# Patient Record
Sex: FEMALE | Race: WHITE | Employment: FULL TIME | URBAN - METROPOLITAN AREA
[De-identification: names, ages, dates, MRNs, and addresses within clinical notes are randomized per-mention and may not be internally consistent; named-entity substitution may affect disease eponyms.]

---

## 2018-07-30 ENCOUNTER — OFFICE VISIT (OUTPATIENT)
Dept: FAMILY MEDICINE CLINIC | Age: 48
End: 2018-07-30

## 2018-07-30 VITALS
WEIGHT: 171 LBS | TEMPERATURE: 98.4 F | OXYGEN SATURATION: 100 % | HEIGHT: 62 IN | SYSTOLIC BLOOD PRESSURE: 122 MMHG | BODY MASS INDEX: 31.47 KG/M2 | RESPIRATION RATE: 16 BRPM | DIASTOLIC BLOOD PRESSURE: 69 MMHG | HEART RATE: 74 BPM

## 2018-07-30 DIAGNOSIS — N91.2 AMENORRHEA: ICD-10-CM

## 2018-07-30 DIAGNOSIS — Z76.89 ENCOUNTER TO ESTABLISH CARE: ICD-10-CM

## 2018-07-30 DIAGNOSIS — Z13.220 SCREENING CHOLESTEROL LEVEL: ICD-10-CM

## 2018-07-30 DIAGNOSIS — R20.2 PARESTHESIA OF LEFT ARM: Primary | ICD-10-CM

## 2018-07-30 NOTE — MR AVS SNAPSHOT
[address] [place] [address] 
[phone] Patient: Juan Carlos Hernandez MRN: [id number] :[id number] Visit Information Date & Time Provider Department Dept. Phone Encounter #  
 2018  1:00 PM Papito Case NP [facility] [phone] [id number] Follow-up Instructions Return in about 2 months (around 2018) for Routine Physical Exam. Upcoming Health Maintenance Date Due Pneumococcal 19-64 Medium Risk (1 of 1 - PPSV23) 3/7/1988 PAP AKA CERVICAL CYTOLOGY 3/7/1990 DTaP/Tdap/Td series (2 - Tdap) 2016 Influenza Age 5 to Adult 2018 Allergies as of 2018  Review Complete On: 2018 By: Papito Case NP Severity Noted Reaction Type Reactions Sulfa (Sulfonamide Antibiotics)  2010    Nausea and Vomiting And rash. ... Current Immunizations  Reviewed on 2018 Name Date DTAP/HEPB/IPV Vaccine 2006 Hepatitis B Vaccine 2012, 2011, 2011 Influenza Vaccine 10/14/2017 PPD 2010, 2008 Reviewed by Clearance PARVEEN Ceballos on 7459 at  8:03 AM  
You Were Diagnosed With   
  
 Codes Comments Paresthesia of left arm    -  Primary ICD-10-CM: R20.2 ICD-9-CM: 782.0 Screening cholesterol level     ICD-10-CM: G46.204 ICD-9-CM: V77.91 Encounter to establish care     ICD-10-CM: Z76.89 
ICD-9-CM: V65.8 Amenorrhea     ICD-10-CM: N91.2 ICD-9-CM: 626.0 Vitals BP Pulse Temp Resp Height(growth percentile) Weight(growth percentile) 122/69 (BP 1 Location: Right arm, BP Patient Position: Sitting) 74 98.4 °F (36.9 °C) (Oral) 16 5' 2\" (1.575 m) 171 lb (77.6 kg) SpO2 BMI OB Status Smoking Status 100% 31.28 kg/m2 Premenopausal Never Smoker Vitals History BMI and BSA Data Body Mass Index Body Surface Area  
 31.28 kg/m 2 1.84 m 2 Preferred Pharmacy Pharmacy Name Phone [address] [phone] Your Updated Medication List  
  
Notice  As of 7/30/2018  1:59 PM  
 You have not been prescribed any medications. We Performed the Following CBC WITH AUTOMATED DIFF [73577 CPT(R)] ESTRADIOL [id number] CPT(R)] FOLATE [id number] CPT(R)] FOLLICLE STIMULATING HORMONE [86719 CPT(R)] HEMOGLOBIN A1C WITH EAG [43927 CPT(R)] HIV 1/2 AG/AB, 4TH GENERATION,W RFLX CONFIRM [id number] CPT(R)] LIPID PANEL [77419 CPT(R)] METABOLIC PANEL, COMPREHENSIVE [77724 CPT(R)] TSH REFLEX TO T4 [81743 CPT(R)] VITAMIN B12 [id number] CPT(R)] Follow-up Instructions Return in about 2 months (around 9/30/2018) for Routine Physical Exam. To-Do List   
 07/30/2018 Neurology:  EMG TWO EXTREMITIES UPPER Patient Instructions Numbness and Tingling: Care Instructions Your Care Instructions Many things can cause numbness or tingling. Swelling may put pressure on a nerve. This could cause you to lose feeling or have a pins-and-needles sensation on part of your body. Nerves may be damaged from trauma, toxins, or diseases, such as diabetes or multiple sclerosis (MS). Sometimes, though, the cause is not clear. If there is no clear reason for your symptoms, and you are not having any other symptoms, your doctor may suggest watching and waiting for a while to see if the numbness or tingling goes away on its own. Your doctor may want you to have blood or nerve tests to find the cause of your symptoms. Follow-up care is a key part of your treatment and safety. Be sure to make and go to all appointments, and call your doctor if you are having problems. It's also a good idea to know your test results and keep a list of the medicines you take. How can you care for yourself at home? · If your doctor prescribes medicine, take it exactly as directed. Call your doctor if you think you are having a problem with your medicine. · If you have any swelling, put ice or a cold pack on the area for 10 to 20 minutes at a time. Put a thin cloth between the ice and your skin. When should you call for help? Call 911 anytime you think you may need emergency care. For example, call if: 
  · You have weakness, numbness, or tingling in both legs.  
  · You lose bowel or bladder control.  
  · You have symptoms of a stroke. These may include: 
¨ Sudden numbness, tingling, weakness, or loss of movement in your face, arm, or leg, especially on only one side of your body. ¨ Sudden vision changes. ¨ Sudden trouble speaking. ¨ Sudden confusion or trouble understanding simple statements. ¨ Sudden problems with walking or balance. ¨ A sudden, severe headache that is different from past headaches.  
 Watch closely for changes in your health, and be sure to contact your doctor if you have any problems, or if: 
  · You do not get better as expected. Where can you learn more? Go to http://moustapha-aspen.info/. Enter P072 in the search box to learn more about \"Numbness and Tingling: Care Instructions. \" Current as of: September 10, 2017 Content Version: 11.7 © 7997-3338 "[employer]". Care instructions adapted under license by [employer] (which disclaims liability or warranty for this information). If you have questions about a medical condition or this instruction, always ask your healthcare professional. [place] [address] any warranty or liability for your use of this information. Learning About Menopause What is menopause? For most women, menopause is a natural process of aging. Menstrual periods gradually stop. The ability to become pregnant ends. Some women feel relief that their childbearing years are ending.  But other women struggle with the physical and emotional changes that come with menopause. For most women, menopause happens around age 48. But every woman's body has its own timeline. Some women stop having periods in their mid-40s. Others keep having periods well into their 50s. And some women go through menopause early because of cancer treatment or surgery to remove the ovaries. What can you expect with menopause? · It starts with perimenopause. This is the process of change that leads up to menopause. Perimenopause can start as early as your late 35s or as late as your early 46s. How long it lasts varies. But it usually lasts from 2 to 8 years. · During this time, your hormone levels will go up and down unevenly (fluctuate). This causes changes in your periods and other symptoms. In time, estrogen and progesterone levels drop enough that the menstrual cycle stops. Going a full year without having a period is usually considered menopause. · Low estrogen levels after menopause speed bone loss. This increases your risk of osteoporosis. Also, your risk of heart disease increases after menopause. · It's normal to have thinner, dryer, wrinkled skin after menopause. The vaginal lining and the lower urinary tract also thin and weaken. This can make it hard to have sex. It can also increase the risk of vaginal and urinary tract infections. What are the symptoms? · Lighter or heavier periods. Your menstrual cycle may be longer or shorter. You may skip periods. · Hot flashes. You may have a sudden feeling of intense body heat. You may sweat, and your head, neck, and chest may get red. Along with hot flashes, you may have a heartbeat that's too fast or not regular. You may also feel anxious or grouchy. In rare cases you might feel panic. · Trouble sleeping. · Mood swings or feeling grouchy, depressed, or worried. · Problems with remembering or thinking clearly. · Vaginal dryness. Some women have only a few mild symptoms. Others have severe symptoms that disrupt their sleep and daily lives. Symptoms tend to last or get worse the first year or more after menopause. Over time, hormones even out at low levels. Many symptoms improve or go away. But some women may have symptoms that don't go away. How are menopause symptoms treated? 
 If you have mild symptoms, you may get some relief if you eat healthy foods, exercise, and lower your stress. Some women choose to take medicines if they have severe symptoms that aren't helped by making changes to their lifestyle. 
 Lifestyle changes 
  · Choose a heart-healthy diet that is low in saturated fat. It should include plenty of fish, fruits, vegetables, beans, and high-fiber grains and breads. Be sure you get enough calcium and vitamin D to help your bones stay strong. Low-fat or nonfat dairy products are a great source of calcium.  
  · Get regular exercise. Exercise can help you manage your weight, keep your heart and bones strong, and lift your mood.  
  · Limit caffeine, alcohol, and stress. These things can make symptoms worse. Limiting them may help you sleep better.  
  · If you smoke, stop. Quitting smoking can reduce hot flashes and long-term health risks. Medicines 
 If your symptoms are severe, talk with your doctor. You may want to try prescription medicines, such as: 
  · Low-dose birth control pills before menopause.  
  · Low-dose hormone therapy (HT) after menopause.  
  · Antidepressants.  
  · A medicine called clonidine (Catapres) that is usually used to treat high blood pressure.  
 All medicines for menopause symptoms have possible risks or side effects. A very small number of women develop serious health problems when taking hormone therapy. Be sure to talk to your doctor about your possible health risks before you start a treatment for menopause symptoms. 
 Other treatments 
 You can try:   · Breathing exercises. They may help reduce hot flashes and emotional symptoms.  
  · Soy. Some women feel that eating lots of soy helps even out their menopause symptoms.  
  · Yoga or biofeedback. They may help reduce stress. Follow-up care is a key part of your treatment and safety. Be sure to make and go to all appointments, and call your doctor if you are having problems. It's also a good idea to know your test results and keep a list of the medicines you take. Where can you learn more? Go to http://moustapha-aspen.info/. Enter H199 in the search box to learn more about \"Learning About Menopause. \" Current as of: October 6, 2017 Content Version: 11.7 © 5524-8077 [employer] (Propri[employer]). Care instructions adapted under license by [employer] (which disclaims liability or warranty for this information). If you have questions about a medical condition or this instruction, always ask your healthcare professional. [place] [address] any warranty or liability for your use of this information. Introducing [facility]! Dear Kallie Goodwin: Thank you for requesting a [employer] account. Our records indicate that you already have an active [employer] account. You can access your account anytime at [email or web address] Did you know that you can access your hospital and ER discharge instructions at any time in [employer]? You can also review all of your test results from your hospital stay or ER visit. Additional Information If you have questions, please visit the Frequently Asked Questions section of the [employer] website at [email or web address] Remember, [employer] is NOT to be used for urgent needs. For medical emergencies, dial 911. Now available from your iPhone and Android! Please provide this summary of care documentation to your next provider. Your primary care clinician is listed as Mony Martínez. If you have any questions after today's visit, please call [phone].

## 2018-07-30 NOTE — PATIENT INSTRUCTIONS
Numbness and Tingling: Care Instructions  Your Care Instructions    Many things can cause numbness or tingling. Swelling may put pressure on a nerve. This could cause you to lose feeling or have a pins-and-needles sensation on part of your body. Nerves may be damaged from trauma, toxins, or diseases, such as diabetes or multiple sclerosis (MS). Sometimes, though, the cause is not clear. If there is no clear reason for your symptoms, and you are not having any other symptoms, your doctor may suggest watching and waiting for a while to see if the numbness or tingling goes away on its own. Your doctor may want you to have blood or nerve tests to find the cause of your symptoms. Follow-up care is a key part of your treatment and safety. Be sure to make and go to all appointments, and call your doctor if you are having problems. It's also a good idea to know your test results and keep a list of the medicines you take. How can you care for yourself at home? · If your doctor prescribes medicine, take it exactly as directed. Call your doctor if you think you are having a problem with your medicine. · If you have any swelling, put ice or a cold pack on the area for 10 to 20 minutes at a time. Put a thin cloth between the ice and your skin. When should you call for help? Call 911 anytime you think you may need emergency care. For example, call if:    · You have weakness, numbness, or tingling in both legs.     · You lose bowel or bladder control.     · You have symptoms of a stroke. These may include:  ¨ Sudden numbness, tingling, weakness, or loss of movement in your face, arm, or leg, especially on only one side of your body. ¨ Sudden vision changes. ¨ Sudden trouble speaking. ¨ Sudden confusion or trouble understanding simple statements. ¨ Sudden problems with walking or balance.   ¨ A sudden, severe headache that is different from past headaches.    Watch closely for changes in your health, and be sure to contact your doctor if you have any problems, or if:    · You do not get better as expected. Where can you learn more? Go to http://moustapha-aspen.info/. Enter W244 in the search box to learn more about \"Numbness and Tingling: Care Instructions. \"  Current as of: September 10, 2017  Content Version: 11.7  © 2384-9420 [employer]. Care instructions adapted under license by [employer] (which disclaims liability or warranty for this information). If you have questions about a medical condition or this instruction, always ask your healthcare professional. [place] [address] any warranty or liability for your use of this information. Learning About Menopause  What is menopause? For most women, menopause is a natural process of aging. Menstrual periods gradually stop. The ability to become pregnant ends. Some women feel relief that their childbearing years are ending. But other women struggle with the physical and emotional changes that come with menopause. For most women, menopause happens around age 48. But every woman's body has its own timeline. Some women stop having periods in their mid-40s. Others keep having periods well into their 50s. And some women go through menopause early because of cancer treatment or surgery to remove the ovaries. What can you expect with menopause? · It starts with perimenopause. This is the process of change that leads up to menopause. Perimenopause can start as early as your late 35s or as late as your early 46s. How long it lasts varies. But it usually lasts from 2 to 8 years. · During this time, your hormone levels will go up and down unevenly (fluctuate). This causes changes in your periods and other symptoms. In time, estrogen and progesterone levels drop enough that the menstrual cycle stops. Going a full year without having a period is usually considered menopause.   · Low estrogen levels after menopause speed bone loss. This increases your risk of osteoporosis. Also, your risk of heart disease increases after menopause. · It's normal to have thinner, dryer, wrinkled skin after menopause. The vaginal lining and the lower urinary tract also thin and weaken. This can make it hard to have sex. It can also increase the risk of vaginal and urinary tract infections. What are the symptoms? · Lighter or heavier periods. Your menstrual cycle may be longer or shorter. You may skip periods. · Hot flashes. You may have a sudden feeling of intense body heat. You may sweat, and your head, neck, and chest may get red. Along with hot flashes, you may have a heartbeat that's too fast or not regular. You may also feel anxious or grouchy. In rare cases you might feel panic. · Trouble sleeping. · Mood swings or feeling grouchy, depressed, or worried. · Problems with remembering or thinking clearly. · Vaginal dryness. Some women have only a few mild symptoms. Others have severe symptoms that disrupt their sleep and daily lives. Symptoms tend to last or get worse the first year or more after menopause. Over time, hormones even out at low levels. Many symptoms improve or go away. But some women may have symptoms that don't go away. How are menopause symptoms treated?   If you have mild symptoms, you may get some relief if you eat healthy foods, exercise, and lower your stress. Some women choose to take medicines if they have severe symptoms that aren't helped by making changes to their lifestyle.   Lifestyle changes    · Choose a heart-healthy diet that is low in saturated fat. It should include plenty of fish, fruits, vegetables, beans, and high-fiber grains and breads. Be sure you get enough calcium and vitamin D to help your bones stay strong. Low-fat or nonfat dairy products are a great source of calcium.     · Get regular exercise.  Exercise can help you manage your weight, keep your heart and bones strong, and lift your mood.     · Limit caffeine, alcohol, and stress. These things can make symptoms worse. Limiting them may help you sleep better.     · If you smoke, stop. Quitting smoking can reduce hot flashes and long-term health risks. Medicines   If your symptoms are severe, talk with your doctor. You may want to try prescription medicines, such as:    · Low-dose birth control pills before menopause.     · Low-dose hormone therapy (HT) after menopause.     · Antidepressants.     · A medicine called clonidine (Catapres) that is usually used to treat high blood pressure.    All medicines for menopause symptoms have possible risks or side effects. A very small number of women develop serious health problems when taking hormone therapy. Be sure to talk to your doctor about your possible health risks before you start a treatment for menopause symptoms.   Other treatments   You can try:    · Breathing exercises. They may help reduce hot flashes and emotional symptoms.     · Soy. Some women feel that eating lots of soy helps even out their menopause symptoms.     · Yoga or biofeedback. They may help reduce stress. Follow-up care is a key part of your treatment and safety. Be sure to make and go to all appointments, and call your doctor if you are having problems. It's also a good idea to know your test results and keep a list of the medicines you take. Where can you learn more? Go to http://moustapha-aspen.info/. Enter H199 in the search box to learn more about \"Learning About Menopause. \"  Current as of: October 6, 2017  Content Version: 11.7  © 8423-8339 [employer]. Care instructions adapted under license by [employer] (which disclaims liability or warranty for this information). If you have questions about a medical condition or this instruction, always ask your healthcare professional. [place] [address] any warranty or liability for your use of this information.

## 2018-07-30 NOTE — PROGRESS NOTES
Chief Complaint   Patient presents with    New Patient     to est. [facility] (former patient here of Dr. Junior Gardner )    Claudication     burning sensation in left arm since about March, feels heavy . Went to [facility] ER this past Thurs. Reviewed Record in preparation for visit and have obtained necessary documentation. Identified pt with two pt identifiers (Name @ )    Health Maintenance Due   Topic    Pneumococcal 19-64 Medium Risk (1 of 1 - PPSV23)    PAP AKA CERVICAL CYTOLOGY     DTaP/Tdap/Td series (2 - Tdap)         1. Have you been to the ER, urgent care clinic since your last visit? Hospitalized since your last visit? See todays encounter    2. Have you seen or consulted any other health care providers outside of the [facility] since your last visit? Include any pap smears or colon screening.  no

## 2018-07-30 NOTE — PROGRESS NOTES
[facility] Note  HPI:   Rosemary Levin is a 51 y.o. female who presents to establish care. Previous provider: Constantine Jesus MD    Paresthesia   Onset 5 months ago, gradually worsening since this time. Location: Left arm, from 1-2 inches above and below elbow. Sometimes symptoms will radiate down to wrist, or up to shoulder. She denies neck pain or decreased ROM, denies shoulder, elbow or wrist joint pain. Symptoms include burning sensation, moderate in severity. Symptoms are intermittent. Last a few seconds at a time, but can come and go for 30 minutes at a time. Aggravating factors: worse when she lays down. Alleviating: shaking arm. Associated symptoms: sensation of \"heaviness\" or weakness of left arm, but has not tried to lift anything up so she cannot confirm true weakness. She had one episode last week with associated nausea, headaches and dizziness. She presented to ER at [facility] at this time; she reports normal EKG, normal labs except for an elevated hgb, \"which they were not concerned about. \" She has a history of BL carpel tunnel s/p repair many years ago and she states this pain feels different. She also endorses occasional muscle soreness of mid upper back, between shoulder blades, since November 2017. However, she does not correlate this pain with arm sensation and heaviness. She reports routine Pap smears are done with GYN. She is agreeable to release records. Last periods was over 1 year ago. She would like to have lab tests drawn today to confirm menopause, she was planning to have these performed at the end of the summer at her next GYN visit. Allergies   Allergen Reactions    Sulfa (Sulfonamide Antibiotics) Nausea and Vomiting     And rash. ...       Patient Active Problem List   Diagnosis Code    Asthma, Mild, Intermittent J45.909    Allergic rhinitis J30.9     Past Medical History:   Diagnosis Date    Allergic rhinitis 9/25/2012    Asthma Well controlled, has not used albuterol in years. She declines albuterol refill on 7/202018 -CW    Carpal tunnel syndrome, bilateral       Past Surgical History:   Procedure Laterality Date    HX CARPAL TUNNEL RELEASE Bilateral     HX CHOLECYSTECTOMY  4/2009      No LMP recorded. Patient is not currently having periods (Reason: Premenopausal). Family History   Problem Relation Age of Onset    Liver Disease Mother      fatty liver    Hypertension Father     Heart Disease Father     Elevated Lipids Father     Elevated Lipids Brother     Diabetes Maternal Grandmother     Stroke Maternal Grandmother     No Known Problems Maternal Grandfather     Alzheimer Paternal Grandmother     Heart Attack Paternal Grandfather     Pancreatic Cancer Paternal Grandfather     No Known Problems Son     No Known Problems Son     Thyroid Disease Neg Hx     Breast Cancer Neg Hx     Uterine Cancer Neg Hx     Ovarian Cancer Neg Hx     Colon Cancer Neg Hx       Social History     Social History    Marital status:      Spouse name: N/A    Number of children: 2    Years of education: N/A     Occupational History     at [employer] History Main Topics    Smoking status: Never Smoker    Smokeless tobacco: Never Used    Alcohol use Yes      Comment: ocassional, 1/per 6 months    Drug use: No    Sexual activity: Yes     Partners: Male     Other Topics Concern    Not on file     Social History Narrative    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 Lives at home with Son. Exercise: ADL's, denies purposeful exercise. Diet: Well balanced, generally avoids fried foods, fatty foods. ROS:   Review of Systems   Constitutional: Negative for chills, fever, malaise/fatigue and weight loss. HENT: Negative for congestion, hearing loss, sinus pain, sore throat and tinnitus. Eyes: Negative for blurred vision, double vision, photophobia and pain.         Endorses some blurred vision with long hours at the computer. She has prescriptive lenses, she has not had routine eye exam in many years. Respiratory: Negative for cough, shortness of breath and wheezing. Cardiovascular: Negative for chest pain, claudication and leg swelling. Gastrointestinal: Negative for abdominal pain, blood in stool, constipation, diarrhea, melena, nausea and vomiting. Genitourinary: Negative for dysuria and urgency. Musculoskeletal: Positive for back pain. Negative for falls, joint pain, myalgias and neck pain. Skin: Negative for itching and rash. Neurological: Positive for dizziness, sensory change and headaches. Negative for tingling, tremors, speech change, focal weakness, seizures, loss of consciousness and weakness. Endo/Heme/Allergies: Negative for polydipsia. Psychiatric/Behavioral: Negative for depression and substance abuse. The patient is not nervous/anxious. Physical Exam:     Visit Vitals    /69 (BP 1 Location: Right arm, BP Patient Position: Sitting)    Pulse 74    Temp 98.4 °F (36.9 °C) (Oral)    Resp 16    Ht 5' 2\" (1.575 m)    Wt 171 lb (77.6 kg)    SpO2 100%    BMI 31.28 kg/m2        Vitals and Nurse Documentation reviewed. Physical Exam   Constitutional: She is oriented to person, place, and time and well-developed, well-nourished, and in no distress. HENT:   Head: Normocephalic and atraumatic. Right Ear: Hearing, external ear and ear canal normal. Tympanic membrane is not injected and not perforated. No middle ear effusion. No decreased hearing is noted. Left Ear: Hearing, external ear and ear canal normal. Tympanic membrane is not injected and not perforated. No middle ear effusion. No decreased hearing is noted. Nose: Nose normal. No mucosal edema or rhinorrhea. Mouth/Throat: Uvula is midline and oropharynx is clear and moist.   Uvula rises with phonation   Eyes: Conjunctivae and EOM are normal. Pupils are equal, round, and reactive to light.    Visual acuity intact Neck: Normal range of motion and phonation normal. Neck supple. Cardiovascular: Normal rate, regular rhythm, normal heart sounds and intact distal pulses. Exam reveals no gallop and no friction rub. No murmur heard. Pulmonary/Chest: Effort normal and breath sounds normal. No respiratory distress. She has no wheezes. She has no rales. She exhibits no tenderness. Musculoskeletal: She exhibits no edema or tenderness. Left shoulder: She exhibits crepitus. She exhibits normal range of motion, no tenderness, no bony tenderness, no swelling and no effusion. Left elbow: She exhibits normal range of motion, no swelling, no effusion and no deformity. No tenderness found. Left wrist: She exhibits normal range of motion, no tenderness, no bony tenderness, no swelling and no deformity. Cervical back: She exhibits normal range of motion, no tenderness, no bony tenderness, no swelling, no edema, no deformity, no pain and no spasm. Thoracic back: She exhibits normal range of motion, no tenderness, no bony tenderness and no pain. Lymphadenopathy:     She has no cervical adenopathy. Neurological: She is alert and oriented to person, place, and time. She has normal sensation, normal strength, normal reflexes and intact cranial nerves. She displays no weakness, no atrophy and facial symmetry. She has a normal Romberg Test. She shows no pronator drift. Gait normal. Coordination normal.   Reflex Scores:       Tricep reflexes are 2+ on the right side and 2+ on the left side. Bicep reflexes are 2+ on the right side and 2+ on the left side. Brachioradialis reflexes are 2+ on the right side and 2+ on the left side. Patellar reflexes are 2+ on the right side and 2+ on the left side. Achilles reflexes are 2+ on the right side and 2+ on the left side. Skin: Skin is warm and dry. She is not diaphoretic.    Psychiatric: Mood, memory, affect and judgment normal.   Nursing note and vitals reviewed. Assessment/ Plan:   [employer] were discussed including hours of operation, on-call providers, and MyChart. Diagnoses and all orders for this visit:    1. Paresthesia of left arm: Chronic (5 months), progressive asymmetric neuropathy of left arm, without neurological deficits on exam today. Unclear etiology, will proceed with EMG testing,as well as basic screening labs. Follow-up and further work-up based on results of study. Encouraged follow-up with opthalmology for periodic vision complaints. -     CBC WITH AUTOMATED DIFF  -     HIV 1/2 AG/AB, 4TH GENERATION,W RFLX CONFIRM  -     TSH REFLEX TO T4  -     VITAMIN B12  -     HEMOGLOBIN A1C WITH EAG  -     METABOLIC PANEL, COMPREHENSIVE  -     FOLATE  -     EMG TWO EXTREMITIES UPPER; Future    2. Screening cholesterol level: Fasting lipids to assess CVD risk.   -     LIPID PANEL    3. Encounter to establish care    4.  Amenorrhea: Reports last period greater than 12 months ago, labs today to confirm post-menopausal status.   -     FOLLICLE STIMULATING HORMONE  -     ESTRADIOL        Follow-up Disposition:  Return in about 2 months (around 9/30/2018) for Routine Physical Exam.     Discussed expected course/resolution/complications of diagnosis in detail with patient.    Medication risks/benefits/costs/interactions/alternatives discussed with patient.    Pt was given an after visit summary which includes diagnoses, current medications & vitals.  Pt expressed understanding with the diagnosis and plan

## 2018-07-31 DIAGNOSIS — E87.5 SERUM POTASSIUM ELEVATED: Primary | ICD-10-CM

## 2018-07-31 DIAGNOSIS — E83.52 SERUM CALCIUM ELEVATED: ICD-10-CM

## 2018-07-31 LAB
ALBUMIN SERPL-MCNC: 5 G/DL (ref 3.5–5.5)
ALBUMIN/GLOB SERPL: 1.7 {RATIO} (ref 1.2–2.2)
ALP SERPL-CCNC: 86 IU/L (ref 39–117)
ALT SERPL-CCNC: 25 IU/L (ref 0–32)
AST SERPL-CCNC: 25 IU/L (ref 0–40)
BASOPHILS # BLD AUTO: 0.1 X10E3/UL (ref 0–0.2)
BASOPHILS NFR BLD AUTO: 1 %
BILIRUB SERPL-MCNC: 0.6 MG/DL (ref 0–1.2)
BUN SERPL-MCNC: 13 MG/DL (ref 6–24)
BUN/CREAT SERPL: 15 (ref 9–23)
CALCIUM SERPL-MCNC: 10.5 MG/DL (ref 8.7–10.2)
CHLORIDE SERPL-SCNC: 100 MMOL/L (ref 96–106)
CHOLEST SERPL-MCNC: 195 MG/DL (ref 100–199)
CO2 SERPL-SCNC: 24 MMOL/L (ref 20–29)
CREAT SERPL-MCNC: 0.89 MG/DL (ref 0.57–1)
EOSINOPHIL # BLD AUTO: 0.3 X10E3/UL (ref 0–0.4)
EOSINOPHIL NFR BLD AUTO: 4 %
ERYTHROCYTE [DISTWIDTH] IN BLOOD BY AUTOMATED COUNT: 14.1 % (ref 12.3–15.4)
EST. AVERAGE GLUCOSE BLD GHB EST-MCNC: 117 MG/DL
ESTRADIOL SERPL-MCNC: <5 PG/ML
FOLATE SERPL-MCNC: 15 NG/ML
FSH SERPL-ACNC: 108.4 MIU/ML
GLOBULIN SER CALC-MCNC: 2.9 G/DL (ref 1.5–4.5)
GLUCOSE SERPL-MCNC: 87 MG/DL (ref 65–99)
HBA1C MFR BLD: 5.7 % (ref 4.8–5.6)
HCT VFR BLD AUTO: 44.7 % (ref 34–46.6)
HDLC SERPL-MCNC: 73 MG/DL
HGB BLD-MCNC: 15.1 G/DL (ref 11.1–15.9)
HIV 1+2 AB+HIV1 P24 AG SERPL QL IA: NON REACTIVE
IMM GRANULOCYTES # BLD: 0 X10E3/UL (ref 0–0.1)
IMM GRANULOCYTES NFR BLD: 0 %
INTERPRETATION, 910389: NORMAL
LDLC SERPL CALC-MCNC: 114 MG/DL (ref 0–99)
LYMPHOCYTES # BLD AUTO: 3.2 X10E3/UL (ref 0.7–3.1)
LYMPHOCYTES NFR BLD AUTO: 44 %
MCH RBC QN AUTO: 30.3 PG (ref 26.6–33)
MCHC RBC AUTO-ENTMCNC: 33.8 G/DL (ref 31.5–35.7)
MCV RBC AUTO: 90 FL (ref 79–97)
MONOCYTES # BLD AUTO: 0.5 X10E3/UL (ref 0.1–0.9)
MONOCYTES NFR BLD AUTO: 7 %
NEUTROPHILS # BLD AUTO: 3.2 X10E3/UL (ref 1.4–7)
NEUTROPHILS NFR BLD AUTO: 44 %
PLATELET # BLD AUTO: 316 X10E3/UL (ref 150–379)
POTASSIUM SERPL-SCNC: 5.6 MMOL/L (ref 3.5–5.2)
PROT SERPL-MCNC: 7.9 G/DL (ref 6–8.5)
RBC # BLD AUTO: 4.99 X10E6/UL (ref 3.77–5.28)
SODIUM SERPL-SCNC: 144 MMOL/L (ref 134–144)
TRIGL SERPL-MCNC: 38 MG/DL (ref 0–149)
TSH SERPL DL<=0.005 MIU/L-ACNC: 1.47 UIU/ML (ref 0.45–4.5)
VIT B12 SERPL-MCNC: 382 PG/ML (ref 232–1245)
VLDLC SERPL CALC-MCNC: 8 MG/DL (ref 5–40)
WBC # BLD AUTO: 7.1 X10E3/UL (ref 3.4–10.8)

## 2018-07-31 NOTE — PROGRESS NOTES
Malik Lu,     Your complete blood count, screening for infection and anemia, is normal.     HIV is negative. Thyroid, Vitamin B 12 and folate are normal.     Labs confirm that you are postmenopausal.    Your hemoglobin A1c (3 month average of blood sugar) was in \"pre-diabetes\" range, and your LDL cholesterol was mildly elevated. To improve these conditions, I recommend focusing on weight loss by decreasing portion sizes, and avoiding unhealthy carbohydrates such as fried foods, fatty foods, sweets, etc. As well as increasing physical activity. I recommend we repeat these labs in 1 year. Your potassium and calcium were slightly elevated, this is likely a normal variation in our lab processing however, I would like to repeat this lab in 3 weeks. You can return when convenient for you, no appointment required and you do not have to be fasting. Please let me know if you have any additional questions.      Thank you,     Giovanny Blake NP

## 2018-08-24 LAB
ALBUMIN SERPL-MCNC: 4.7 G/DL (ref 3.5–5.5)
ALBUMIN/GLOB SERPL: 1.9 {RATIO} (ref 1.2–2.2)
ALP SERPL-CCNC: 89 IU/L (ref 39–117)
ALT SERPL-CCNC: 23 IU/L (ref 0–32)
AST SERPL-CCNC: 21 IU/L (ref 0–40)
BILIRUB SERPL-MCNC: 0.3 MG/DL (ref 0–1.2)
BUN SERPL-MCNC: 9 MG/DL (ref 6–24)
BUN/CREAT SERPL: 12 (ref 9–23)
CALCIUM SERPL-MCNC: 9.5 MG/DL (ref 8.7–10.2)
CHLORIDE SERPL-SCNC: 104 MMOL/L (ref 96–106)
CO2 SERPL-SCNC: 27 MMOL/L (ref 20–29)
CREAT SERPL-MCNC: 0.78 MG/DL (ref 0.57–1)
GLOBULIN SER CALC-MCNC: 2.5 G/DL (ref 1.5–4.5)
GLUCOSE SERPL-MCNC: 91 MG/DL (ref 65–99)
POTASSIUM SERPL-SCNC: 5 MMOL/L (ref 3.5–5.2)
PROT SERPL-MCNC: 7.2 G/DL (ref 6–8.5)
SODIUM SERPL-SCNC: 144 MMOL/L (ref 134–144)

## 2018-08-27 DIAGNOSIS — R20.2 PARESTHESIA OF LEFT UPPER EXTREMITY: Primary | ICD-10-CM

## 2018-08-27 NOTE — PROGRESS NOTES
My chart message sent:   Hi Ms. Michelle Sandra,     Your repeat lab work was normal! As you may already know, the results from your EMG (electromypgraphy) and Nerve conduction study were normal. This rules out any nerve pain caused by compression of your nerves along the extremity. If your symptoms are persisting, I recommend evaluation by a neurologist. I have order a referral to Dr. Shad Soto. His office is at [facility]. Please call to schedule an appointment at your convenience. His contact info is below. Phone: [address], [address] [facility] [phone]     Please let me know if you have any additional questions.     Army Cici NP

## 2018-09-25 ENCOUNTER — OFFICE VISIT (OUTPATIENT)
Dept: NEUROLOGY | Age: 48
End: 2018-09-25

## 2018-09-25 VITALS
RESPIRATION RATE: 14 BRPM | BODY MASS INDEX: 32.39 KG/M2 | WEIGHT: 176 LBS | SYSTOLIC BLOOD PRESSURE: 114 MMHG | HEART RATE: 87 BPM | HEIGHT: 62 IN | OXYGEN SATURATION: 97 % | DIASTOLIC BLOOD PRESSURE: 80 MMHG

## 2018-09-25 DIAGNOSIS — M79.602 LEFT ARM PAIN: ICD-10-CM

## 2018-09-25 DIAGNOSIS — R42 DIZZINESS: Primary | ICD-10-CM

## 2018-09-25 NOTE — PROGRESS NOTES
Patient is here for evaluation of \"heat\" in left arm Sx since March Get worse with being tired or laying down

## 2018-09-25 NOTE — LETTER
9/25/2018 1:37 PM 
 
Patient:  Prince Culver YOB: 1970 Date of Visit: 9/25/2018 Dear MD [address] [id number] VIA In Basket [address] [place] [address] VIA In Basket 
 : Thank you for referring Ms. Charlie Murray to me for evaluation/treatment. Below are the relevant portions of my assessment and plan of care. If you have questions, please do not hesitate to call me. I look forward to following Ms. Brice Lennon along with you. Sincerely, Indu Garcia MD

## 2018-09-25 NOTE — PROGRESS NOTES
Chief Complaint Patient presents with  Neurologic Problem HISTORY OF PRESENT ILLNESS Ludivina Mclean is a 51 y.o. female who came in for evaluation of symptoms of lightheadedness and abnormal sensations in the left arm for the past 6 months. She thinks that these are becoming more frequent. It starts suddenly and she will start to feel burning/heat no nausea or vomiting. No chest pain shortness of breath or palpitations. Sensation around her elbow that can radiate down towards the hand and occasionally will come up to her shoulder. At the same time she will feel lightheaded and dizzy. It used to mainly come on when she would lay down but now it occurs in almost any position. No associated headache or neck pain. Denies ever falling in relation to this or loss of consciousness. No focal motor or sensory deficits in the extremities. Episode can last a few minutes and then clears up. Sometimes it will happen multiple times per day. At times she has felt a sense of weakness in her left arm after the episode. She did have an EMG/NCV done at Dr. Rhonda Atkins office and it was reportedly negative. Past Medical History:  
Diagnosis Date  Allergic rhinitis 9/25/2012  Asthma Well controlled, has not used albuterol in years. She declines albuterol refill on 7/202018 -CW  Carpal tunnel syndrome, bilateral   
 Headache No current outpatient prescriptions on file. No current facility-administered medications for this visit. Allergies Allergen Reactions  Sulfa (Sulfonamide Antibiotics) Nausea and Vomiting And rash. ... Family History Problem Relation Age of Onset  Liver Disease Mother   
  fatty liver  Hypertension Father  Heart Disease Father  Elevated Lipids Father  Elevated Lipids Brother  Diabetes Maternal Grandmother  Stroke Maternal Grandmother  No Known Problems Maternal Grandfather  Alzheimer Paternal Grandmother  Pancreatic Cancer Paternal Grandmother  Heart Attack Paternal Grandfather  Pancreatic Cancer Paternal Grandfather  Migraines Paternal Grandfather  No Known Problems Son  No Known Problems Son  Thyroid Disease Neg Hx  Breast Cancer Neg Hx  Uterine Cancer Neg Hx   
 Ovarian Cancer Neg Hx  Colon Cancer Neg Hx Social History Substance Use Topics  Smoking status: Never Smoker  Smokeless tobacco: Never Used  Alcohol use Yes Comment: ocassional, 1/per 6 months Past Surgical History:  
Procedure Laterality Date  HX CARPAL TUNNEL RELEASE Bilateral   
 HX CHOLECYSTECTOMY  4/2009  HX HEENT    
 HX ORTHOPAEDIC REVIEW OF SYSTEMS Review of Systems - History obtained from the patient Psychological ROS: negative ENT ROS: negative Hematological and Lymphatic ROS: negative Endocrine ROS: negative Respiratory ROS: no cough, shortness of breath, or wheezing Cardiovascular ROS: no chest pain or dyspnea on exertion Gastrointestinal ROS: no abdominal pain, change in bowel habits, or black or bloody stools Genito-Urinary ROS: no dysuria, trouble voiding, or hematuria Musculoskeletal ROS: negative Dermatological ROS: negative PHYSICAL EXAMINATION:   
Visit Vitals  /80  Pulse 87  Resp 14  
 Ht 5' 2\" (1.575 m)  Wt 79.8 kg (176 lb)  SpO2 97%  BMI 32.19 kg/m2 General:  Well defined, nourished, and groomed individual in no acute distress. Neck: Supple, nontender, no bruits, no pain with resistance to active range of motion. Heart: Regular rate and rhythm, no murmurs, rub, or gallop. Normal S1S2. Lungs:  Clear to auscultation bilaterally with equal chest expansion, no cough, no wheeze Musculoskeletal:  Extremities revealed no edema and had full range of motion of joints. Psych:  Good mood and bright affect NEUROLOGICAL EXAMINATION:    
 Mental Status:   Alert and oriented to person, place, and time with recent and remote memory intact. Attention span and concentration are normal. Speech is fluent with a full fund of knowledge. Cranial Nerves:   
II, III, IV, VI:  Visual acuity grossly intact. Visual fields are normal.   
Pupils are equal, round, and reactive to light and accommodation. Extra-ocular movements are full and fluid. Fundoscopic exam was benign, no ptosis or nystagmus. V-XII: Hearing is grossly intact. Facial features are symmetric, with normal sensation and strength. The palate rises symmetrically and the tongue protrudes midline. Sternocleidomastoids 5/5. Motor Examination: Normal tone, bulk, and strength. 5/5 muscle strength throughout. No cogwheel rigidity or clonus present. Sensory exam:  Normal throughout to pinprick, temperature, and vibration sense. Normal proprioception. Coordination:  Heel-to-shin was smooth and symmetrical bilaterally. Finger to nose and rapid arm movement testing was normal.   No resting or intention tremor Gait and Station:  Steady while walking on toes, heels, and with tandem walking. Normal arm swing. No Rhomberg or pronator drift. No muscle wasting or fasiculations noted. Reflexes:  DTRs 2+ throughout. Toes downgoing. ASSESSMENT 
  ICD-10-CM ICD-9-CM 1. Dizziness R42 780.4 MRI BRAIN W WO CONT  
   EEG 2. Left arm pain M79.602 729.5 MRI BRAIN W WO CONT  
   EEG  
 
 
DISCUSSION Ms. Ludivina Mclean has had episodic focal sensory symptoms in the left upper extremity associated with dizziness/lightheadedness. I have recommended MRI scan of the brain to rule out a structural cause of the right cerebral hemisphere and also a baseline EEG to rule out electrocerebral disturbance that could potentially cause a sensory seizure. If these tests are negative, we may consider imaging of the cervical spine. Thank you for allowing me to participate in the care of MsNate Zepeda. Please feel free to contact me if you have any questions. I will be happy to follow to follow her along with you. Ryanne Powers MD 
Diplomate, American Board of Psychiatry & Neurology (Neurology) [place]arely Arce Board of Psychiatry & Neurology (Clinical Neurophysiology) Diplomate, [address] [place] [address] Board of Electrodiagnostic Medicine

## 2018-09-25 NOTE — MR AVS SNAPSHOT
[facility]  [address] [place] [address] 
[phone] Patient: Rosemary Levin MRN:  [id number] Visit Information Date & Time Provider Department Dept. Phone Encounter #  
 2018  8:00 AM Edgardo Miller MD [facility] at [address] [id number] Follow-up Instructions Return if symptoms worsen or fail to improve. Upcoming Health Maintenance Date Due Pneumococcal 19-64 Medium Risk (1 of 1 - PPSV23) 3/7/1988 PAP AKA CERVICAL CYTOLOGY 3/7/1990 DTaP/Tdap/Td series (2 - Tdap) 2016 Influenza Age 5 to Adult 2018 Allergies as of 2018  Review Complete On: 2018 By: Rajni Steven Severity Noted Reaction Type Reactions Sulfa (Sulfonamide Antibiotics)  2010    Nausea and Vomiting And rash. ... Current Immunizations  Reviewed on 2018 Name Date DTAP/HEPB/IPV Vaccine 2006 Hepatitis B Vaccine 2012, 2011, 2011 Influenza Vaccine 10/14/2017 PPD 2010, 2008 Not reviewed this visit You Were Diagnosed With   
  
 Codes Comments Dizziness    -  Primary ICD-10-CM: H05 ICD-9-CM: 780.4 Left arm pain     ICD-10-CM: M79.602 ICD-9-CM: 729.5 Vitals BP Pulse Resp Height(growth percentile) Weight(growth percentile) LMP  
 114/80 87 14 5' 2\" (1.575 m) 176 lb (79.8 kg) 2017 SpO2 BMI OB Status Smoking Status 97% 32.19 kg/m2 Postmenopausal Never Smoker Vitals History BMI and BSA Data Body Mass Index Body Surface Area  
 32.19 kg/m 2 1.87 m 2 Preferred Pharmacy Pharmacy Name Phone [facility] [phone] Your Updated Medication List  
  
Notice  As of 2018  8:31 AM  
 You have not been prescribed any medications. Follow-up Instructions Return if symptoms worsen or fail to improve. To-Do List   
 09/26/2018 Neurology:  EEG   
  
 09/26/2018 Imaging:  MRI BRAIN W WO CONT Introducing [facility] SERVICES! Dear Maryjane Dick: Thank you for requesting a [employer] account. Our records indicate that you already have an active [employer] account. You can access your account anytime at [email or web address] Did you know that you can access your hospital and ER discharge instructions at any time in [employer]? You can also review all of your test results from your hospital stay or ER visit. Additional Information If you have questions, please visit the Frequently Asked Questions section of the [employer] website at [email or web address] Remember, [employer] is NOT to be used for urgent needs. For medical emergencies, dial 911. Now available from your iPhone and Android! Please provide this summary of care documentation to your next provider. Your primary care clinician is listed as Kris Shelton. If you have any questions after today's visit, please call [phone].

## 2018-10-08 ENCOUNTER — HOSPITAL ENCOUNTER (OUTPATIENT)
Dept: MRI IMAGING | Age: 48
Discharge: HOME | End: 2018-10-08
Attending: PSYCHIATRY & NEUROLOGY
Payer: COMMERCIAL

## 2018-10-08 ENCOUNTER — HOSPITAL ENCOUNTER (OUTPATIENT)
Dept: NEUROLOGY | Age: 48
Discharge: HOME | End: 2018-10-08
Attending: PSYCHIATRY & NEUROLOGY
Payer: COMMERCIAL

## 2018-10-08 VITALS — BODY MASS INDEX: 31.09 KG/M2 | WEIGHT: 170 LBS

## 2018-10-08 DIAGNOSIS — R42 DIZZINESS: ICD-10-CM

## 2018-10-08 DIAGNOSIS — M79.602 LEFT ARM PAIN: ICD-10-CM

## 2018-10-08 PROCEDURE — 95816 EEG AWAKE AND DROWSY: CPT

## 2018-10-08 PROCEDURE — A9575 INJ GADOTERATE MEGLUMI 0.1ML: HCPCS | Performed by: RADIOLOGY

## 2018-10-08 PROCEDURE — 70553 MRI BRAIN STEM W/O & W/DYE: CPT

## 2018-10-08 PROCEDURE — 74011250636 HC RX REV CODE- 250/636: Performed by: RADIOLOGY

## 2018-10-08 RX ORDER — GADOTERATE MEGLUMINE 376.9 MG/ML
15 INJECTION INTRAVENOUS
Status: COMPLETED | OUTPATIENT
Start: 2018-10-08 | End: 2018-10-08

## 2018-10-08 RX ADMIN — GADOTERATE MEGLUMINE 15 ML: 376.9 INJECTION INTRAVENOUS at 14:18

## 2018-10-09 NOTE — PROGRESS NOTES
MRI brain and EEG showed nonspecific abnormalities which does not clearly explain her symptoms. Please schedule a follow-up to discuss.

## 2018-10-09 NOTE — PROCEDURES
Patient Name: Niyah Zheng  : 1970  Age: 51 y.o. Ordering physician: Devyn Horan  Date of EEG: 10/8/2018  EEG procedure number: [id number]  Diagnosis: spell  Interpreting physician: Dominic Proctor MD      ELECTROENCEPHALOGRAM REPORT     PROCEDURE: EEG. CLINICAL INDICATION: The patient is a 51 y.o. female with a history of   possible seizures. EEG to rule out seizures, rule out stroke, rule out   cortical abnormality. EEG CLASSIFICATION: Essentially normal    DESCRIPTION OF THE RECORD:   The background of this recording contains a posteriorly-located occipital alpha rhythm of 11 Hz that attenuates with eye opening. Throughout the recording, there were no clear areas of focal slowing nor spike or spike-and-wave discharges seen. Hyperventilation produced no response. Photic stimulation produced a minimal driving response in the posterior head regions. During the recording the patient did not achieve stage II sleep    INTERPRETATION: This is a normal electroencephalogram showing no clear focal abnormalities or epileptiform activity. A normal EEG doesn't not rule out seizures. Clinical correlation recommended.     Brennen Villalobos MD  10/8/2018  8:48 PM

## 2018-10-10 ENCOUNTER — TELEPHONE (OUTPATIENT)
Dept: NEUROLOGY | Age: 48
End: 2018-10-10

## 2018-10-10 NOTE — TELEPHONE ENCOUNTER
----- Message from Jude Pascal MD sent at 10/9/2018 11:31 AM EDT -----  MRI brain and EEG showed nonspecific abnormalities which does not clearly explain her symptoms. Please schedule a follow-up to discuss.

## 2018-10-17 ENCOUNTER — OFFICE VISIT (OUTPATIENT)
Dept: NEUROLOGY | Age: 48
End: 2018-10-17

## 2018-10-17 VITALS
SYSTOLIC BLOOD PRESSURE: 120 MMHG | HEIGHT: 62 IN | WEIGHT: 176 LBS | DIASTOLIC BLOOD PRESSURE: 80 MMHG | RESPIRATION RATE: 14 BRPM | BODY MASS INDEX: 32.39 KG/M2 | OXYGEN SATURATION: 98 % | HEART RATE: 81 BPM

## 2018-10-17 DIAGNOSIS — G45.9 TIA (TRANSIENT ISCHEMIC ATTACK): ICD-10-CM

## 2018-10-17 DIAGNOSIS — R42 DIZZINESS: ICD-10-CM

## 2018-10-17 DIAGNOSIS — M79.602 LEFT ARM PAIN: Primary | ICD-10-CM

## 2018-10-17 DIAGNOSIS — R90.89 ABNORMAL FINDING ON MRI OF BRAIN: ICD-10-CM

## 2018-10-17 NOTE — PROGRESS NOTES
Chief Complaint Patient presents with  Dizziness  Neurologic Problem HISTORY OF PRESENT ILLNESS Shira Ashly came back for follow-up. Her symptoms are unchanged. She continues to experience a feeling of heat/discomfort in the left arm that comes and goes. It will last a few seconds but can be repetitive at times. She will start to get dizzy and lightheaded if that happens. Brain MRI did not show any abnormalities that would explain her symptoms. There was small white matter lesion in the left frontal white matter that showed a questionable area of enhancement. There were no other white matter spots. EEG showed mild slowing in the frontotemporal head regions which is again a nonspecific finding. RECAP She has been having these symptoms of lightheadedness and abnormal sensations in the left arm for the past 6 months. She thinks that these are becoming more frequent. It starts suddenly and she will start to feel burning/heat no nausea or vomiting. No chest pain shortness of breath or palpitations. Sensation around her elbow that can radiate down towards the hand and occasionally will come up to her shoulder. At the same time she will feel lightheaded and dizzy. It used to mainly come on when she would lay down but now it occurs in almost any position. No associated headache or neck pain. Denies ever falling in relation to this or loss of consciousness. No focal motor or sensory deficits in the extremities. Episode can last a few minutes and then clears up. Sometimes it will happen multiple times per day. At times she has felt a sense of weakness in her left arm after the episode. She did have an EMG/NCV done at Dr. Alin Brewster office and it was reportedly negative. No current outpatient medications on file. No current facility-administered medications for this visit. PHYSICAL EXAMINATION:   
Visit Vitals /80 Pulse 81 Resp 14 Ht 5' 2\" (1.575 m) Wt 79.8 kg (176 lb) SpO2 98% BMI 32.19 kg/m² NEUROLOGICAL EXAMINATION:    
Mental Status:   Alert and oriented to person, place, and time. Attention span and concentration are normal. Speech is fluent with a full fund of knowledge. Cranial Nerves:   
II, III, IV, VI:  Visual acuity grossly intact. Visual fields are normal.   
Pupils are equal, round, and reactive Extra-ocular movements are full and fluid. .  
V-XII: Hearing is grossly intact. Facial features are symmetric, with normal sensation and strength. The palate rises symmetrically and the tongue protrudes midline. Sternocleidomastoids 5/5. Motor Examination: Normal tone, bulk, and strength. Sensory exam:  Normal throughout Coordination:  Finger to nose and rapid arm movement testing was normal.   No resting or intention tremor Gait and Station:  Steady . Normal arm swing. No Rhomberg or pronator drift. No muscle wasting or fasiculations noted. Reflexes:  DTRs 2+ throughout. Toes downgoing. LABS/IMAGING 
 
MRI brain images and EEG was independently reviewed CBC, CMP, B12, TSH, HIV test was negative ASSESSMENT 
  ICD-10-CM ICD-9-CM 1. Left arm pain M79.602 729.5 MRI CERV SPINE W WO CONT 2. Dizziness R42 780.4 3. Abnormal finding on MRI of brain R90.89 793.0 MRI CERV SPINE W WO CONT  
   CTA HEAD NECK W CONT 4. TIA (transient ischemic attack) G45.9 435.9 CTA HEAD NECK W CONT  
 
 
DISCUSSION Ms. Bonnie Morfin has had episodic focal sensory symptoms in the left upper extremity associated with dizziness/lightheadedness. We discussed that MRI brain findings and EEG findings are very nonspecific and does not explain her presenting symptom We will proceed with imaging of the cervical spine i.e. MRI C-spine to rule out any additional lesions or any degenerative joint/disc disease that could be causing her symptoms We may need to consider repeating EMG to study plexus CTA of the head and neck to rule out any large vessel occlusion/arterial dissection that could be causing recurrent ischemia Repeat MRI of the brain in 6 months and consider additional lab work/CSF depending upon clinical course Serena Underwood MD 
Diplomate, American Board of Psychiatry & Neurology (Neurology) Taye Abreu Board of Psychiatry & Neurology (Clinical Neurophysiology) Diplomate, [address] [place] [address] Board of Electrodiagnostic Medicine This note will not be viewable in [address]

## 2018-10-17 NOTE — PATIENT INSTRUCTIONS
A Healthy Lifestyle: Care Instructions Your Care Instructions A healthy lifestyle can help you feel good, stay at a healthy weight, and have plenty of energy for both work and play. A healthy lifestyle is something you can share with your whole family. A healthy lifestyle also can lower your risk for serious health problems, such as high blood pressure, heart disease, and diabetes. You can follow a few steps listed below to improve your health and the health of your family. Follow-up care is a key part of your treatment and safety. Be sure to make and go to all appointments, and call your doctor if you are having problems. It's also a good idea to know your test results and keep a list of the medicines you take. How can you care for yourself at home? · Do not eat too much sugar, fat, or fast foods. You can still have dessert and treats now and then. The goal is moderation. · Start small to improve your eating habits. Pay attention to portion sizes, drink less juice and soda pop, and eat more fruits and vegetables. ? Eat a healthy amount of food. A 3-ounce serving of meat, for example, is about the size of a deck of cards. Fill the rest of your plate with vegetables and whole grains. ? Limit the amount of soda and sports drinks you have every day. Drink more water when you are thirsty. ? Eat at least 5 servings of fruits and vegetables every day. It may seem like a lot, but it is not hard to reach this goal. A serving or helping is 1 piece of fruit, 1 cup of vegetables, or 2 cups of leafy, raw vegetables. Have an apple or some carrot sticks as an afternoon snack instead of a candy bar. Try to have fruits and/or vegetables at every meal. 
· Make exercise part of your daily routine. You may want to start with simple activities, such as walking, bicycling, or slow swimming. Try to be active 30 to 60 minutes every day.  You do not need to do all 30 to 60 minutes all at once. For example, you can exercise 3 times a day for 10 or 20 minutes. Moderate exercise is safe for most people, but it is always a good idea to talk to your doctor before starting an exercise program. 
· Keep moving. Deirdre Distad the lawn, work in the garden, or [place]. Take the stairs instead of the elevator at work. · If you smoke, quit. People who smoke have an increased risk for heart attack, stroke, cancer, and other lung illnesses. Quitting is hard, but there are ways to boost your chance of quitting tobacco for good. ? Use nicotine gum, patches, or lozenges. ? Ask your doctor about stop-smoking programs and medicines. ? Keep trying. In addition to reducing your risk of diseases in the future, you will notice some benefits soon after you stop using tobacco. If you have shortness of breath or asthma symptoms, they will likely get better within a few weeks after you quit. · Limit how much alcohol you drink. Moderate amounts of alcohol (up to 2 drinks a day for men, 1 drink a day for women) are okay. But drinking too much can lead to liver problems, high blood pressure, and other health problems. Family health If you have a family, there are many things you can do together to improve your health. · Eat meals together as a family as often as possible. · Eat healthy foods. This includes fruits, vegetables, lean meats and dairy, and whole grains. · Include your family in your fitness plan. Most people think of activities such as jogging or tennis as the way to fitness, but there are many ways you and your family can be more active. Anything that makes you breathe hard and gets your heart pumping is exercise. Here are some tips: 
? Walk to do errands or to take your child to school or the bus. 
? Go for a family bike ride after dinner instead of watching TV. Where can you learn more? Go to http://moustapha-aspen.info/. Enter B520 in the search box to learn more about \"A Healthy Lifestyle: Care Instructions. \" Current as of: December 7, 2017 Content Version: 11.8 © 9471-8673 Healthwise, [employer]. Care instructions adapted under license by [employer] (which disclaims liability or warranty for this information). If you have questions about a medical condition or this instruction, always ask your healthcare professional. [place] [address] any warranty or liability for your use of this information.

## 2018-10-31 ENCOUNTER — HOSPITAL ENCOUNTER (OUTPATIENT)
Dept: CT IMAGING | Age: 48
Discharge: HOME | End: 2018-10-31
Attending: PSYCHIATRY & NEUROLOGY
Payer: COMMERCIAL

## 2018-10-31 ENCOUNTER — HOSPITAL ENCOUNTER (OUTPATIENT)
Dept: MRI IMAGING | Age: 48
Discharge: HOME | End: 2018-10-31
Attending: PSYCHIATRY & NEUROLOGY
Payer: COMMERCIAL

## 2018-10-31 VITALS — WEIGHT: 176 LBS | HEIGHT: 62 IN | BODY MASS INDEX: 32.39 KG/M2

## 2018-10-31 DIAGNOSIS — R90.89 ABNORMAL FINDING ON MRI OF BRAIN: ICD-10-CM

## 2018-10-31 DIAGNOSIS — M79.602 LEFT ARM PAIN: ICD-10-CM

## 2018-10-31 DIAGNOSIS — G45.9 TIA (TRANSIENT ISCHEMIC ATTACK): ICD-10-CM

## 2018-10-31 PROCEDURE — 72156 MRI NECK SPINE W/O & W/DYE: CPT

## 2018-10-31 PROCEDURE — 74011636320 HC RX REV CODE- 636/320: Performed by: RADIOLOGY

## 2018-10-31 PROCEDURE — 70498 CT ANGIOGRAPHY NECK: CPT

## 2018-10-31 PROCEDURE — A9575 INJ GADOTERATE MEGLUMI 0.1ML: HCPCS | Performed by: RADIOLOGY

## 2018-10-31 PROCEDURE — 74011250636 HC RX REV CODE- 250/636: Performed by: RADIOLOGY

## 2018-10-31 RX ORDER — GADOTERATE MEGLUMINE 376.9 MG/ML
15 INJECTION INTRAVENOUS
Status: COMPLETED | OUTPATIENT
Start: 2018-10-31 | End: 2018-10-31

## 2018-10-31 RX ADMIN — IOPAMIDOL 100 ML: 755 INJECTION, SOLUTION INTRAVENOUS at 08:46

## 2018-10-31 RX ADMIN — GADOTERATE MEGLUMINE 15 ML: 376.9 INJECTION INTRAVENOUS at 08:35

## 2018-11-02 ENCOUNTER — TELEPHONE (OUTPATIENT)
Dept: NEUROLOGY | Age: 48
End: 2018-11-02

## 2018-11-02 DIAGNOSIS — M47.812 OSTEOARTHRITIS OF CERVICAL SPINE, UNSPECIFIED SPINAL OSTEOARTHRITIS COMPLICATION STATUS: Primary | ICD-10-CM

## 2018-11-02 NOTE — TELEPHONE ENCOUNTER
----- Message from Tori Teixeira MD sent at 11/2/2018  8:52 AM EDT -----  MRI of the cervical spine shows mild arthritis and disc deterioration that could be a potential cause of her symptoms. I recommend a course of PT. Will print referral and she should follow-up after completing it.

## 2018-11-02 NOTE — PROGRESS NOTES
MRI of the cervical spine shows mild arthritis and disc deterioration that could be a potential cause of her symptoms. I recommend a course of PT. Will print referral and she should follow-up after completing it.

## 2018-11-13 DIAGNOSIS — R20.2 PARESTHESIA OF LEFT ARM: ICD-10-CM

## 2018-11-16 ENCOUNTER — TELEPHONE (OUTPATIENT)
Dept: NEUROLOGY | Age: 48
End: 2018-11-16

## 2018-12-04 ENCOUNTER — HOSPITAL ENCOUNTER (OUTPATIENT)
Dept: PHYSICAL THERAPY | Age: 48
Discharge: HOME | End: 2018-12-04
Payer: COMMERCIAL

## 2018-12-04 PROCEDURE — 97112 NEUROMUSCULAR REEDUCATION: CPT | Performed by: PHYSICAL THERAPIST

## 2018-12-04 PROCEDURE — 97161 PT EVAL LOW COMPLEX 20 MIN: CPT | Performed by: PHYSICAL THERAPIST

## 2018-12-04 NOTE — PROGRESS NOTES
PT INITIAL EVALUATION NOTE 2-15 Patient Name: Jillian Jerry 
Date:2018 : 1970 [x]  Patient  Verified Payor: BLUE CROSS / Plan: [facility] PPO / Product Type: PPO / In time:1040a  Out time:1140a Total Treatment Time (min): 60 Visit #: 1 Treatment Area: Left arm pain [M79.602] SUBJECTIVE Pain Level (0-10 scale): 0/10 Any medication changes, allergies to medications, adverse drug reactions, diagnosis change, or new procedure performed?: [] No    [x] Yes (see summary sheet for update) Subjective:    
Late 2018 she noticed that she ould lay down and then notice left forearm burning and light headedness that passed quickly. Symptoms progressed from just lying down to then sitting up, then more frequently. She is now noticing that the frequency is reducing to now mostly only when she would lie down. Frequency has reduced form during the day to just at night since 2018. EMG normal, MRI and EEG (slight lesion on left frontal lobe) normal. 
MRI of neck Reads in bed with supine and head elevated on pillow into cervical flexion and still experiences the burning and dizziness at night while reading. OBJECTIVE Posture: Forward head posture Other Observations:  -- 
Gait and Functional Mobility:  -- 
Palpation: increased muscle guarding sub-occipital region Cervical AROM:   
    R  L Flexion    30 Deviation to right Extension   30 Deviaiton to right Side Bending   20  18 Rotation   70  60 UPPER QUARTER   MUSCLE STRENGTH 
KEY       R  L 
0 - No Contraction  C1, C2 Neck Flex 5  5 
1 - Trace   C3 Side Flex  5  5 
2 - Poor   C4 Sh Elev  5  5 
3 - Fair    C5 Deltoid/Biceps 5  5 
4 - Good   C6 Wrist Ext  5  5 
5 - Normal   C7 Triceps  5  5 C8 Thumb Ext  5  5 
    T1 Hand Inst  5  5 Flexibility: sub-occipital stiffness Mobility Assessment: O-A A-P hypomobility and left C5/6-C7/T1 downslide hypomobility Neurological: Reflexes / Sensations: normal 
Special Tests: Cervical Distraction: no change  Cervical Compression: no change Spurling Test: negative 10 min Neuromuscular Re-education:  []  See flow sheet : postural awareness training Rationale: increase ROM, increase strength, improve coordination, improve balance and increase proprioception  to improve the patients ability to maintain upright posture 5 min Manual Therapy:  and sub-occipital STM instruction Rationale: decrease pain, increase ROM and increase tissue extensibility  to improve the patients ability to look down without increased cervical stress With 
 [] TE 
 [] TA [x] neuro [x] other: MT Patient Education: [x] Review HEP [] Progressed/Changed HEP based on:  
[] positioning   [] body mechanics   [] transfers   [] heat/ice application   
[] other:   
 
 
Other Objective/Functional Measures: FOTO Functional Measure: -- 
 
Pain Level (0-10 scale) post treatment: 0/10 ASSESSMENT:  
  
[x]  See Plan of Care Linh Rogers PT, DPT 12/4/2018  10:48 AM

## 2018-12-04 NOTE — PROGRESS NOTES
[facility] [address] [address], [address] Phone: [phone]  Fax: [phone] Plan of Care/Statement of Necessity for Physical Therapy Services  2-15 Patient name: Niesha Shelby  : 1970  Provider#: [id number] Referral source: Neda Alatorre MD     
Medical/Treatment Diagnosis: Left arm pain [M79.602] Prior Hospitalization: see medical history Comorbidities: C6/7 left lateral disc lesion Prior Level of Function: complete 20 minutes of exercise seldom or never Medications: Verified on Patient Summary List 
 
Start of Care: 2018      Onset Date: 3/2018 The Plan of Care and following information is based on the information from the initial evaluation. Assessment/ haynes information: 51 y.o. Female with healing C6/7 left lateral disc lesion with progressive improvement of symptoms with remaining Occipital-[place] (O-A) Anterior-Posterior (A-P) Hypomobility and slight left C5/6-C7/T1 downslide hypomobility with associated abnormal muscle recruitment patterns in the cervical region complicated by postural strain with sitting work tasks. Evaluation Complexity History MEDIUM  Complexity : 1-2 comorbidities / personal factors will impact the outcome/ POC ; Examination HIGH Complexity : 4+ Standardized tests and measures addressing body structure, function, activity limitation and / or participation in recreation  ;Presentation LOW Complexity : Stable, uncomplicated  ; Overall Complexity Rating: LOW Problem List: pain affecting function, decrease ROM, decrease ADL/ functional abilitiies, decrease activity tolerance and decrease flexibility/ joint mobility Treatment Plan may include any combination of the following: Therapeutic exercise, Therapeutic activities, Neuromuscular re-education, Physical agent/modality, Gait/balance training, Manual therapy, Patient education and Self Care training Patient / Family readiness to learn indicated by: asking questions and trying to perform skills Persons(s) to be included in education: patient (P) Barriers to Learning/Limitations: None Patient Goal (s): symptom relief Patient Self Reported Health Status: fair Rehabilitation Potential: good Long Term Goals: To be accomplished in 3-6 weeks: 
1) Pt will be able to read without reproduction of symptoms 2) Pt will be able to look over shoulders while driving without reproduction of symptoms 3) Pt will be able to start physical activity program without reproduction of symptoms 4) Pt will be independent with HEP Frequency / Duration: Patient to be seen 2 times per week for 3-6 weeks. Patient/ Caregiver education and instruction: activity modification and exercises 
 
[x]  Plan of care has been reviewed with DEBRA Cuadra, PT, DPT 12/4/2018 1:27 PM 
 
________________________________________________________________________ I certify that the above Therapy Services are being furnished while the patient is under my care. I agree with the treatment plan and certify that this therapy is necessary. [de-identified] Signature:____________________  Date:____________Time: _________

## 2018-12-07 ENCOUNTER — HOSPITAL ENCOUNTER (OUTPATIENT)
Dept: PHYSICAL THERAPY | Age: 48
Discharge: HOME | End: 2018-12-07
Payer: COMMERCIAL

## 2018-12-07 PROCEDURE — 97140 MANUAL THERAPY 1/> REGIONS: CPT | Performed by: PHYSICAL THERAPIST

## 2018-12-07 PROCEDURE — 97110 THERAPEUTIC EXERCISES: CPT | Performed by: PHYSICAL THERAPIST

## 2018-12-07 NOTE — PROGRESS NOTES
PT DAILY TREATMENT NOTE - [facility] 2-15 Patient Name: Juvenal Flores 
Date:2018 : 1970 [x]  Patient  Verified Payor: BLUE CROSS / Plan: [facility] PPO / Product Type: PPO / [place] [id number] Total Treatment Time (min): 60 Total Timed Codes (min): 60 
1:1 Treatment Time ( only): 40 Visit #: 2 Treatment Area: Left arm pain [M79.602] SUBJECTIVE Pain Level (0-10 scale): 0/10 Any medication changes, allergies to medications, adverse drug reactions, diagnosis change, or new procedure performed?: [x] No    [] Yes (see summary sheet for update) Subjective functional status/changes:   [] No changes reported Pt states that she tried the self massage and did not notice any reduction in symptoms when she went to bed. She did notice a dizziness episode yesterday in the day. OBJECTIVE 35 min Therapeutic Exercise:  [x] See flow sheet :  
Rationale: increase ROM, increase strength, improve coordination, improve balance and increase proprioception to improve the patients ability to look over shoulders 25 min Manual Therapy: STM bilateral sub-occipital region, paraspinal muscles, upslide C5/6-C7/T1 Right and Left grade 3-4, downslideC5/6-C7/T1 Left grade 3-4, O-A A-P mobs grade 3-4, passive cervical rotation movement Rationale: decrease pain, increase ROM and increase tissue extensibility to improve the patients ability to look over shoulders With 
 [x] TE 
 [] TA 
 [] neuro 
 [] other: Patient Education: [x] Review HEP [] Progressed/Changed HEP based on:  
[] positioning   [] body mechanics   [] transfers   [] heat/ice application   
[] other:   
 
Other Objective/Functional Measures: Cervical AROM Rotation R 80 L 80 Pain Level (0-10 scale) post treatment: 0/10 ASSESSMENT/Changes in Function:  
Progressed with cervical mobility today. She did have right AC joint crepitus with end range shoulder extension during standing rows.   Reduced with limiting shoulder extension. Patient will continue to benefit from skilled PT services to modify and progress therapeutic interventions, address functional mobility deficits, address ROM deficits, address strength deficits, analyze and address soft tissue restrictions, analyze and cue movement patterns, analyze and modify body mechanics/ergonomics and assess and modify postural abnormalities to attain remaining goals. []  See Plan of Care 
[]  See progress note/recertification 
[]  See Discharge Summary Progress towards goals / Updated goals: 
Long Term Goals: To be accomplished in 3-6 weeks: 
1) Pt will be able to read without reproduction of symptoms 2) Pt will be able to look over shoulders while driving without reproduction of symptoms 3) Pt will be able to start physical activity program without reproduction of symptoms 4) Pt will be independent with HEP 
  
 
PLAN [x]  Upgrade activities as tolerated     [x]  Continue plan of care 
[]  Update interventions per flow sheet      
[]  Discharge due to:_ 
[]  Other:_   
 
Gloria Eagle PT, DPT 12/7/2018  9:12 AM

## 2018-12-12 ENCOUNTER — HOSPITAL ENCOUNTER (OUTPATIENT)
Dept: PHYSICAL THERAPY | Age: 48
Discharge: HOME | End: 2018-12-12
Payer: COMMERCIAL

## 2018-12-12 PROCEDURE — 97140 MANUAL THERAPY 1/> REGIONS: CPT

## 2018-12-12 PROCEDURE — 97110 THERAPEUTIC EXERCISES: CPT

## 2018-12-12 NOTE — PROGRESS NOTES
PT DAILY TREATMENT NOTE - [facility] 2-15    Patient Name: Lizzie Sethi  Date:2018  : 1970  [x]  Patient  Verified  Payor: Kushal Chery / Plan: [facility] [id number] PPO / Product Type: PPO /    In time: 7:00A  Out time:  7:50A  Total Treatment Time (min): 50  Total Timed Codes (min): 50  1:1 Treatment Time ( only): 50  Visit #: 3    Treatment Area: Left arm pain [M79.602]    SUBJECTIVE  Pain Level (0-10 scale): 0/10  Any medication changes, allergies to medications, adverse drug reactions, diagnosis change, or new procedure performed?: [x] No    [] Yes (see summary sheet for update)  Subjective functional status/changes:   [] No changes reported  Pt baked over 600 cookies this weekend. Pt was tired from standing on her feet but felt no pain, no dizziness. Dizzy spells at night are becoming less. OBJECTIVE      25 min Therapeutic Exercise:  [x] See flow sheet :   Rationale: increase ROM, increase strength, improve coordination, improve balance and increase proprioception to improve the patients ability to look over shoulders      25 min Manual Therapy: STM bilateral sub-occipital region, paraspinal muscles, upslide C5/6-C7/T1 Right and Left grade 3-4, downslideC5/6-C7/T1 Left grade 3-4, O-A A-P mobs grade 3-4, passive cervical rotation movement    Rationale: decrease pain, increase ROM and increase tissue extensibility to improve the patients ability to look over shoulders        With   [x] TE   [] TA   [] neuro   [] other: Patient Education: [x] Review HEP    [] Progressed/Changed HEP based on:   [] positioning   [] body mechanics   [] transfers   [] heat/ice application    [] other:      Other Objective/Functional Measures: Cervical AROM Rotation R 70 L 65 (pre manual) R 80 L 75(post manual)     Pain Level (0-10 scale) post treatment: 0/10    ASSESSMENT/Changes in Function:   Added tband shoulder extensions today. Pt had crepitus in L shoulder today.  Noted decrease with proper engagement of scapular retraction and decrease shoulder shrug. Patient will continue to benefit from skilled PT services to modify and progress therapeutic interventions, address functional mobility deficits, address ROM deficits, address strength deficits, analyze and address soft tissue restrictions, analyze and cue movement patterns, analyze and modify body mechanics/ergonomics and assess and modify postural abnormalities to attain remaining goals. [x]  See Plan of Care  []  See progress note/recertification  []  See Discharge Summary         Progress towards goals / Updated goals:  Long Term Goals:  To be accomplished in 3-6 weeks:  1) Pt will be able to read without reproduction of symptoms  2) Pt will be able to look over shoulders while driving without reproduction of symptoms  3) Pt will be able to start physical activity program without reproduction of symptoms  4) Pt will be independent with HEP       PLAN  [x]  Upgrade activities as tolerated     [x]  Continue plan of care  []  Update interventions per flow sheet       []  Discharge due to:_  []  Other:_      Nancy Kerns PTA, OPTA 12/12/2018  9:12 AM

## 2018-12-17 ENCOUNTER — HOSPITAL ENCOUNTER (OUTPATIENT)
Dept: PHYSICAL THERAPY | Age: 48
Discharge: HOME | End: 2018-12-17
Payer: COMMERCIAL

## 2018-12-17 PROCEDURE — 97140 MANUAL THERAPY 1/> REGIONS: CPT | Performed by: PHYSICAL THERAPIST

## 2018-12-17 PROCEDURE — 97112 NEUROMUSCULAR REEDUCATION: CPT | Performed by: PHYSICAL THERAPIST

## 2018-12-17 NOTE — PROGRESS NOTES
PT DAILY TREATMENT NOTE - [facility] 2-15    Patient Name: Kaylyn Villarreal  Date:2018  : 1970  [x]  Patient  Verified  Payor: BLUE CROSS / Plan: [employer] [id number] PPO / Product Type: PPO /    In time:330p  Out time:430p  Total Treatment Time (min): 60  Total Timed Codes (min): 60  1:1 Treatment Time ( only): 30   Visit #: 4     Treatment Area: Left arm pain [M79.602]    SUBJECTIVE  Pain Level (0-10 scale): 0/10  Any medication changes, allergies to medications, adverse drug reactions, diagnosis change, or new procedure performed?: [x] No    [] Yes (see summary sheet for update)  Subjective functional status/changes:   [] No changes reported  Pt states that she can tell a differnce on the days that she does not do the stretching. They definately help. She did notice bilateral hand tingling with sidebending.     OBJECTIVE       30 min Therapeutic Exercise:  [x] See flow sheet :   Rationale: increase ROM, increase strength, improve coordination, improve balance and increase proprioception to improve the patients ability to look over shoulders    15 min Neuromuscular Reeducation: cueing and instruction for cervical subcranial active mobility and postural awarenss   Rationale: increase ROM, increase strength, improve coordination, improve balance and increase proprioception to improve the patients ability to look over shoulders        15 min Manual Therapy: STM bilateral sub-occipital region, paraspinal muscles, upslide C5/6-C7/T1 Right and Left grade 3-4, downslideC5/6-C7/T1 Left grade 3-4, O-A A-P mobs grade 3-4, passive cervical rotation movement    Rationale: decrease pain, increase ROM and increase tissue extensibility to improve the patients ability to look over shoulders           With   [x] TE   [] TA   [] neuro   [] other: Patient Education: [x] Review HEP    [] Progressed/Changed HEP based on:   [] positioning   [] body mechanics   [] transfers   [] heat/ice application    [] other:    Other Objective/Functional Measures: Cervical AROM Rotation R 88 L 85             Pain Level (0-10 scale) post treatment: 0/10     ASSESSMENT/Changes in Function:   Able to perform seated chin tuck and active subcaranial sidebending well today with out increase of pain. Patient will continue to benefit from skilled PT services to modify and progress therapeutic interventions, address functional mobility deficits, address ROM deficits, address strength deficits, analyze and address soft tissue restrictions, analyze and cue movement patterns, analyze and modify body mechanics/ergonomics and assess and modify postural abnormalities to attain remaining goals.      []  See Plan of Care  []  See progress note/recertification  []  See Discharge Summary         Progress towards goals / Updated goals:  Long Term Goals: To be accomplished in 3-6 weeks:  1) Pt will be able to read without reproduction of symptoms  2) Pt will be able to look over shoulders while driving without reproduction of symptoms MET  3) Pt will be able to start physical activity program without reproduction of symptoms Progressing  4) Pt will be independent with HEP Progressing        PLAN  [x]  Upgrade activities as tolerated     [x]  Continue plan of care  []  Update interventions per flow sheet       []  Discharge due to:_  []  Other:_          Nilay Reeves, PT, DPT 12/17/2018  3:52 PM

## 2018-12-19 ENCOUNTER — HOSPITAL ENCOUNTER (OUTPATIENT)
Dept: PHYSICAL THERAPY | Age: 48
Discharge: HOME | End: 2018-12-19
Payer: COMMERCIAL

## 2018-12-19 PROCEDURE — 97140 MANUAL THERAPY 1/> REGIONS: CPT | Performed by: PHYSICAL THERAPIST

## 2018-12-19 PROCEDURE — 97110 THERAPEUTIC EXERCISES: CPT | Performed by: PHYSICAL THERAPIST

## 2018-12-19 NOTE — PROGRESS NOTES
PT DAILY TREATMENT NOTE - [facility] 2-15    Patient Name: Stevan Davis  Date:2018  : 1970  [x]  Patient  Verified  Payor: BLUE CROSS / Plan: [facility] PPO / Product Type: PPO /    In time:830a  Out time:920a  Total Treatment Time (min): 50  Total Timed Codes (min): 50  1:1 Treatment Time ( only): 30   Visit #: 5     Treatment Area: Left arm pain [M79.602]    SUBJECTIVE  Pain Level (0-10 scale): 0/10  Any medication changes, allergies to medications, adverse drug reactions, diagnosis change, or new procedure performed?: [x] No    [] Yes (see summary sheet for update)  Subjective functional status/changes:   [] No changes reported  Pt states that the dizziness at night is less frequent. OBJECTIVE    35 min Therapeutic Exercise:  [x] See flow sheet :   Rationale: increase ROM, increase strength, improve coordination, improve balance and increase proprioception to improve the patients ability to look over shoulders          15 min Manual Therapy: STM bilateral sub-occipital region, paraspinal muscles, upslide C5/6-C7/T1 Right and Left grade 3-4, downslideC5/6-C7/T1 Left grade 3-4, O-A A-P mobs grade 3-4, passive cervical rotation movement    Rationale: decrease pain, increase ROM and increase tissue extensibility to improve the patients ability to look over shoulders           With   [x] TE   [] TA   [] neuro   [] other: Patient Education: [x] Review HEP    [] Progressed/Changed HEP based on:   [] positioning   [] body mechanics   [] transfers   [] heat/ice application    [] other:       Other Objective/Functional Measures: Cervical AROM Rotation Pre-treat R 78   L 78; Post-treat R 90 L 85             Pain Level (0-10 scale) post treatment: 0/10     ASSESSMENT/Changes in Function:   Continuing to improve cervical mobility and coordination of upper an lower torso movement with active cervical rotation during movement.   Patient will continue to benefit from skilled PT services to modify and progress therapeutic interventions, address functional mobility deficits, address ROM deficits, address strength deficits, analyze and address soft tissue restrictions, analyze and cue movement patterns, analyze and modify body mechanics/ergonomics and assess and modify postural abnormalities to attain remaining goals.     []  See Plan of Care  []  See progress note/recertification  []  See Discharge Summary     Progress towards goals / Updated goals:  Long Term Goals: To be accomplished in 3-6 weeks:  1) Pt will be able to read without reproduction of symptoms Progressing  2) Pt will be able to look over shoulders while driving without reproduction of symptoms MET  3) Pt will be able to start physical activity program without reproduction of symptoms MET  4) Pt will be independent with HEP Progressing        PLAN  [x]  Upgrade activities as tolerated     [x]  Continue plan of care  []  Update interventions per flow sheet       []  Discharge due to:_  []  Other:_          Ceci Art, PT, DPT 12/19/2018  3:07 PM

## 2018-12-28 ENCOUNTER — HOSPITAL ENCOUNTER (OUTPATIENT)
Dept: PHYSICAL THERAPY | Age: 48
Discharge: HOME | End: 2018-12-28
Payer: COMMERCIAL

## 2018-12-28 PROCEDURE — 97110 THERAPEUTIC EXERCISES: CPT | Performed by: PHYSICAL THERAPIST

## 2018-12-28 NOTE — PROGRESS NOTES
PT DAILY TREATMENT NOTE - [facility] 2-15 Patient Name: Karl Maki 
Date:2018 : 1970 [x]  Patient  Verified Payor: BLUE CROSS / Plan: [facility] PPO / Product Type: PPO / In time:800a  Out time:854a Total Treatment Time (min): 54 Total Timed Codes (min): 54 
1:1 Treatment Time ([facility] only): 54 Visit #: 6 Treatment Area: Left arm pain [M79.602] SUBJECTIVE Pain Level (0-10 scale): 0/10 Any medication changes, allergies to medications, adverse drug reactions, diagnosis change, or new procedure performed?: [x] No    [] Yes (see summary sheet for update) Subjective functional status/changes:   [] No changes reported Pt states that she is noticing even less dizziness when going to sleep now. No pain with activity. OBJECTIVE 54 min Therapeutic Exercise:  [x] See flow sheet : preparation of HEP Rationale: increase ROM, increase strength, improve coordination, improve balance and increase proprioception to improve the patients ability to look over shoulders   
  
  
  
With 
 [x] TE 
 [] TA 
 [] neuro 
 [] other: Patient Education: [x] Review HEP [] Progressed/Changed HEP based on:  
[] positioning   [] body mechanics   [] transfers   [] heat/ice application   
[] other:   
  
Other Objective/Functional Measures: Cervical AROM Rotation R 90 L 88        
  
Pain Level (0-10 scale) post treatment: 0/10 
  
ASSESSMENT/Changes in Function:  
Good technique with upper/lower trunk disassociation with 1/2 [place] get up. Added median nerve glide to HEP and provided specific HEP to continue on her own. 
  
[]  See Plan of Care 
[]  See progress note/recertification 
[x]  See Discharge Summary Progress towards goals / Updated goals: 
Long Term Goals: To be accomplished in 3-6 weeks: 
1) Pt will be able to read without reproduction of symptoms MET 
2) Pt will be able to look over shoulders while driving without reproduction of symptoms MET 
 3) Pt will be able to start physical activity program without reproduction of symptoms MET 
4) Pt will be independent with HEP MET 
  
 
 
  
 
 
  
PLAN 
[]  Upgrade activities as tolerated     []  Continue plan of care 
[]  Update interventions per flow sheet [x]  Discharge due to:_ completion of goals 
[]  Other:_   
 
 
 
Stefani Rollins, PT, DPT 12/28/2018  8:35 AM

## 2019-01-04 NOTE — ANCILLARY DISCHARGE INSTRUCTIONS
[facility] [address] [address], [address] [place] [address] Phone: [phone]  Fax: [phone] Discharge Summary  2-15 Patient name: Aris Winslow  : 1970  Provider#: [id number] Referral source: Debby De La Paz MD     
Medical/Treatment Diagnosis: Left arm pain [M79.602] Prior Hospitalization: see medical history Comorbidities: C6/7 left lateral disc lesion Prior Level of Function: complete 20 minutes of exercise seldom or never Medications: Verified on Patient Summary List 
  
Start of Care: 2018                                                                              Onset Date: 3/2018 Visits from Start of Care: 6     Missed Visits: 0 Reporting Period : 2018 to 2018 Progress towards goals / Updated goals: 
Long Term Goals: To be accomplished in 3-6 weeks: 
1) Pt will be able to read without reproduction of symptoms MET 
2) Pt will be able to look over shoulders while driving without reproduction of symptoms MET 3) Pt will be able to start physical activity program without reproduction of symptoms MET 
4) Pt will be independent with HEP MET 
 
 
ASSESSMENT/SUMMARY OF CARE:  Yoel Jackson states that she is noticing even less dizziness when going to sleep now. No pain with activity. Normalization of cervical mobility and active control with improved postural awareness in sitting and standing Objective/Functional Measures: Cervical AROM Rotation R 90 L 88        
  
 
 
 
RECOMMENDATIONS: 
[x]Discontinue therapy: [x]Patient has reached or is progressing toward set goals []Patient is non-compliant or has abdicated 
    []Due to lack of appreciable progress towards set goals Cinthya Mart, PT, DPT 1/3/2019 11:41 PM